# Patient Record
Sex: MALE | Employment: FULL TIME | URBAN - METROPOLITAN AREA
[De-identification: names, ages, dates, MRNs, and addresses within clinical notes are randomized per-mention and may not be internally consistent; named-entity substitution may affect disease eponyms.]

---

## 2024-02-19 ENCOUNTER — HOSPITAL ENCOUNTER (EMERGENCY)
Facility: HOSPITAL | Age: 44
Discharge: HOME | End: 2024-02-19
Attending: STUDENT IN AN ORGANIZED HEALTH CARE EDUCATION/TRAINING PROGRAM
Payer: COMMERCIAL

## 2024-02-19 ENCOUNTER — APPOINTMENT (OUTPATIENT)
Dept: RADIOLOGY | Facility: HOSPITAL | Age: 44
End: 2024-02-19
Payer: COMMERCIAL

## 2024-02-19 VITALS
BODY MASS INDEX: 27.28 KG/M2 | HEIGHT: 68 IN | TEMPERATURE: 98.4 F | DIASTOLIC BLOOD PRESSURE: 91 MMHG | HEART RATE: 82 BPM | SYSTOLIC BLOOD PRESSURE: 161 MMHG | RESPIRATION RATE: 16 BRPM | OXYGEN SATURATION: 100 % | WEIGHT: 180 LBS

## 2024-02-19 DIAGNOSIS — R91.1 PULMONARY NODULE: ICD-10-CM

## 2024-02-19 DIAGNOSIS — D72.829 LEUKOCYTOSIS, UNSPECIFIED TYPE: ICD-10-CM

## 2024-02-19 DIAGNOSIS — R74.8 ELEVATED LIPASE: ICD-10-CM

## 2024-02-19 DIAGNOSIS — K52.9 COLITIS: ICD-10-CM

## 2024-02-19 DIAGNOSIS — K59.00 CONSTIPATION, UNSPECIFIED CONSTIPATION TYPE: Primary | ICD-10-CM

## 2024-02-19 DIAGNOSIS — K76.0 HEPATIC STEATOSIS: ICD-10-CM

## 2024-02-19 DIAGNOSIS — K56.41 FECAL IMPACTION (MULTI): ICD-10-CM

## 2024-02-19 DIAGNOSIS — K40.91 UNILATERAL RECURRENT INGUINAL HERNIA WITHOUT OBSTRUCTION OR GANGRENE: ICD-10-CM

## 2024-02-19 LAB
ALBUMIN SERPL-MCNC: 4.9 G/DL (ref 3.5–5)
ALP BLD-CCNC: 90 U/L (ref 35–125)
ALT SERPL-CCNC: 29 U/L (ref 5–40)
ANION GAP SERPL CALC-SCNC: 11 MMOL/L
APPEARANCE UR: CLEAR
AST SERPL-CCNC: 17 U/L (ref 5–40)
BASOPHILS # BLD AUTO: 0.07 X10*3/UL (ref 0–0.1)
BASOPHILS NFR BLD AUTO: 0.5 %
BILIRUB SERPL-MCNC: <0.2 MG/DL (ref 0.1–1.2)
BILIRUB UR STRIP.AUTO-MCNC: NEGATIVE MG/DL
BUN SERPL-MCNC: 18 MG/DL (ref 8–25)
CALCIUM SERPL-MCNC: 9.3 MG/DL (ref 8.5–10.4)
CHLORIDE SERPL-SCNC: 102 MMOL/L (ref 97–107)
CO2 SERPL-SCNC: 29 MMOL/L (ref 24–31)
COLOR UR: NORMAL
CREAT SERPL-MCNC: 0.8 MG/DL (ref 0.4–1.6)
EGFRCR SERPLBLD CKD-EPI 2021: >90 ML/MIN/1.73M*2
EOSINOPHIL # BLD AUTO: 0.14 X10*3/UL (ref 0–0.7)
EOSINOPHIL NFR BLD AUTO: 1 %
ERYTHROCYTE [DISTWIDTH] IN BLOOD BY AUTOMATED COUNT: 12.4 % (ref 11.5–14.5)
GLUCOSE SERPL-MCNC: 123 MG/DL (ref 65–99)
GLUCOSE UR STRIP.AUTO-MCNC: NORMAL MG/DL
HCT VFR BLD AUTO: 43.6 % (ref 41–52)
HGB BLD-MCNC: 14.3 G/DL (ref 13.5–17.5)
IMM GRANULOCYTES # BLD AUTO: 0.05 X10*3/UL (ref 0–0.7)
IMM GRANULOCYTES NFR BLD AUTO: 0.4 % (ref 0–0.9)
KETONES UR STRIP.AUTO-MCNC: NEGATIVE MG/DL
LEUKOCYTE ESTERASE UR QL STRIP.AUTO: NEGATIVE
LIPASE SERPL-CCNC: 240 U/L (ref 16–63)
LYMPHOCYTES # BLD AUTO: 2.6 X10*3/UL (ref 1.2–4.8)
LYMPHOCYTES NFR BLD AUTO: 19 %
MCH RBC QN AUTO: 28.9 PG (ref 26–34)
MCHC RBC AUTO-ENTMCNC: 32.8 G/DL (ref 32–36)
MCV RBC AUTO: 88 FL (ref 80–100)
MONOCYTES # BLD AUTO: 0.7 X10*3/UL (ref 0.1–1)
MONOCYTES NFR BLD AUTO: 5.1 %
MUCOUS THREADS #/AREA URNS AUTO: NORMAL /LPF
NEUTROPHILS # BLD AUTO: 10.12 X10*3/UL (ref 1.2–7.7)
NEUTROPHILS NFR BLD AUTO: 74 %
NITRITE UR QL STRIP.AUTO: NEGATIVE
NRBC BLD-RTO: 0 /100 WBCS (ref 0–0)
PH UR STRIP.AUTO: 6.5 [PH]
PLATELET # BLD AUTO: 293 X10*3/UL (ref 150–450)
POTASSIUM SERPL-SCNC: 4.3 MMOL/L (ref 3.4–5.1)
PROT SERPL-MCNC: 7.4 G/DL (ref 5.9–7.9)
PROT UR STRIP.AUTO-MCNC: NORMAL MG/DL
RBC # BLD AUTO: 4.94 X10*6/UL (ref 4.5–5.9)
RBC # UR STRIP.AUTO: NEGATIVE /UL
RBC #/AREA URNS AUTO: NORMAL /HPF
SODIUM SERPL-SCNC: 142 MMOL/L (ref 133–145)
SP GR UR STRIP.AUTO: 1.03
UROBILINOGEN UR STRIP.AUTO-MCNC: NORMAL MG/DL
WBC # BLD AUTO: 13.7 X10*3/UL (ref 4.4–11.3)
WBC #/AREA URNS AUTO: NORMAL /HPF

## 2024-02-19 PROCEDURE — 96360 HYDRATION IV INFUSION INIT: CPT | Mod: 59

## 2024-02-19 PROCEDURE — 2500000001 HC RX 250 WO HCPCS SELF ADMINISTERED DRUGS (ALT 637 FOR MEDICARE OP): Performed by: CLINICAL NURSE SPECIALIST

## 2024-02-19 PROCEDURE — 74177 CT ABD & PELVIS W/CONTRAST: CPT

## 2024-02-19 PROCEDURE — 2550000001 HC RX 255 CONTRASTS: Performed by: CLINICAL NURSE SPECIALIST

## 2024-02-19 PROCEDURE — 99285 EMERGENCY DEPT VISIT HI MDM: CPT | Mod: 25

## 2024-02-19 PROCEDURE — 80053 COMPREHEN METABOLIC PANEL: CPT | Performed by: EMERGENCY MEDICINE

## 2024-02-19 PROCEDURE — 2500000004 HC RX 250 GENERAL PHARMACY W/ HCPCS (ALT 636 FOR OP/ED): Performed by: EMERGENCY MEDICINE

## 2024-02-19 PROCEDURE — 76705 ECHO EXAM OF ABDOMEN: CPT

## 2024-02-19 PROCEDURE — 36415 COLL VENOUS BLD VENIPUNCTURE: CPT | Performed by: EMERGENCY MEDICINE

## 2024-02-19 PROCEDURE — 2550000001 HC RX 255 CONTRASTS: Performed by: STUDENT IN AN ORGANIZED HEALTH CARE EDUCATION/TRAINING PROGRAM

## 2024-02-19 PROCEDURE — 83690 ASSAY OF LIPASE: CPT | Performed by: EMERGENCY MEDICINE

## 2024-02-19 PROCEDURE — 81001 URINALYSIS AUTO W/SCOPE: CPT | Performed by: EMERGENCY MEDICINE

## 2024-02-19 PROCEDURE — 85025 COMPLETE CBC W/AUTO DIFF WBC: CPT | Performed by: EMERGENCY MEDICINE

## 2024-02-19 RX ORDER — POLYETHYLENE GLYCOL 3350, SODIUM CHLORIDE, SODIUM BICARBONATE, POTASSIUM CHLORIDE 420; 11.2; 5.72; 1.48 G/4L; G/4L; G/4L; G/4L
2000 POWDER, FOR SOLUTION ORAL ONCE
Status: COMPLETED | OUTPATIENT
Start: 2024-02-19 | End: 2024-02-19

## 2024-02-19 RX ORDER — AMOXICILLIN AND CLAVULANATE POTASSIUM 875; 125 MG/1; MG/1
1 TABLET, FILM COATED ORAL 2 TIMES DAILY
Qty: 20 TABLET | Refills: 0 | Status: SHIPPED | OUTPATIENT
Start: 2024-02-19 | End: 2024-02-29

## 2024-02-19 RX ORDER — POLYETHYLENE GLYCOL 3350 17 G/17G
17 POWDER, FOR SOLUTION ORAL DAILY
Qty: 7 PACKET | Refills: 0 | Status: SHIPPED | OUTPATIENT
Start: 2024-02-19 | End: 2024-02-26

## 2024-02-19 RX ORDER — POLYETHYLENE GLYCOL 3350 17 G/17G
17 POWDER, FOR SOLUTION ORAL DAILY
Qty: 7 PACKET | Refills: 0 | Status: SHIPPED | OUTPATIENT
Start: 2024-02-19 | End: 2024-02-19

## 2024-02-19 RX ADMIN — POLYETHYLENE GLYCOL 3350, SODIUM SULFATE ANHYDROUS, SODIUM BICARBONATE, SODIUM CHLORIDE, POTASSIUM CHLORIDE 2000 ML: 236; 22.74; 6.74; 5.86; 2.97 POWDER, FOR SOLUTION ORAL at 21:22

## 2024-02-19 RX ADMIN — SODIUM CHLORIDE 1000 ML: 900 INJECTION, SOLUTION INTRAVENOUS at 20:07

## 2024-02-19 RX ADMIN — IOHEXOL 75 ML: 350 INJECTION, SOLUTION INTRAVENOUS at 20:59

## 2024-02-19 ASSESSMENT — PAIN - FUNCTIONAL ASSESSMENT: PAIN_FUNCTIONAL_ASSESSMENT: 0-10

## 2024-02-19 ASSESSMENT — PAIN DESCRIPTION - LOCATION: LOCATION: ABDOMEN

## 2024-02-19 ASSESSMENT — COLUMBIA-SUICIDE SEVERITY RATING SCALE - C-SSRS
6. HAVE YOU EVER DONE ANYTHING, STARTED TO DO ANYTHING, OR PREPARED TO DO ANYTHING TO END YOUR LIFE?: NO
2. HAVE YOU ACTUALLY HAD ANY THOUGHTS OF KILLING YOURSELF?: NO
1. IN THE PAST MONTH, HAVE YOU WISHED YOU WERE DEAD OR WISHED YOU COULD GO TO SLEEP AND NOT WAKE UP?: NO

## 2024-02-19 ASSESSMENT — PAIN DESCRIPTION - PAIN TYPE: TYPE: ACUTE PAIN

## 2024-02-19 ASSESSMENT — PAIN DESCRIPTION - DESCRIPTORS: DESCRIPTORS: DISCOMFORT;PRESSURE

## 2024-02-19 ASSESSMENT — PAIN SCALES - GENERAL: PAINLEVEL_OUTOF10: 9

## 2024-02-19 NOTE — ED TRIAGE NOTES
"   TRIAGE NOTE   I saw the patient as the Clinician in Triage and performed a brief history and physical exam, established acuity, and ordered appropriate tests to develop basic plan of care. Patient will be seen by an JEAN, resident and/or physician who will independently evaluate the patient. Please see subsequent provider notes for further details and disposition.     Brief HPI: In brief, Luc Wong is a 46 y.o. male that presents for evaluation of constipation.  The patient states that he had a normal bowel movement 2 mornings ago, on Saturday morning.  He did not have a bowel movement yesterday and throughout the day today he feels like he has have a bowel movement but cannot pass any stool.  He states that today he stuck his finger into his rectum and felt hard stool but he was unable to remove any stool.  He states that his finger just pushed the stool \"up\".  Patient denies any blood per rectum.  He does describe some vague suprapubic pressure discomfort.  He is complaining of rectal pain especially when he sits down.  Focused Physical exam:   Triage vitals remarkable for elevated blood pressure.  Heart rate is in the 80s with regular rhythm and no murmurs.  Lungs are clear to auscultation bilaterally.  The abdomen is soft and nondistended and vaguely tender with palpation in the suprapubic region.  No guarding or rigidity.  Plan/MDM:   Plan is for IV fluids, labs and CT abdomen and pelvis.  Please see subsequent provider note for further details and disposition     Mati Collado D.O.  6:44 PM  "

## 2024-02-20 LAB — HOLD SPECIMEN: NORMAL

## 2024-02-20 NOTE — ED PROVIDER NOTES
Department of Emergency Medicine   ED  Provider Note  Admit Date/RoomTime: 2/19/2024  6:14 PM  ED Room: ST25/ST25        History of Present Illness:  Chief Complaint   Patient presents with    Constipation         Luc Wong is a 46 y.o. male presenting to the ED for constipation per triage note patient had a normal bowel movement 2 mornings ago and has not had a bowel movement yesterday or today.  Per the triage note patient felt hard stool in his rectum.  Patient initially seen evaluated in triage orders placed   Upon my evaluation:  Patient complains of rectal pain.  Reports his last bowel movement was a day and a half ago.  He is able to feel hard stool in his rectum but unable to get it out.  He complains of abdominal fullness.  Denies any abdominal pain no nausea no vomiting no fever no chills no cough congestion runny nose sore throat.  Eating and drinking per his normal urinating per his normal.  Reports he just feels like he is full stool.  Has significant history of inguinal hernia with no complications.  Normally has no complications with his bowel movements.      review of Systems:   Pertinent positives and negatives are stated within HPI, all other systems reviewed and are negative.        --------------------------------------------- PAST HISTORY ---------------------------------------------  Past Medical History:  has no past medical history on file.  Past Surgical History:  has no past surgical history on file.  Social History:    Family History: family history is not on file.. Unless otherwise noted, family history is non contributory  The patient’s home medications have been reviewed.  Allergies: Patient has no known allergies.        ---------------------------------------------------PHYSICAL EXAM--------------------------------------    GENERAL APPEARANCE: Awake and alert.   VITAL SIGNS: As per the nurses' triage record.   HEENT: Normocephalic, atraumatic. Extraocular muscles are intact.   "Conjunctiva are pink. Negative scleral icterus. Mucous membranes are moist. Tongue in the midline. Pharynx was without erythema or exudates, uvula midline  CHEST: Nontender to palpation. Clear to auscultation bilaterally. No rales, rhonchi, or wheezing.   HEART: S1, S2. Regular rate and rhythm. No murmurs, gallops or rubs.  Strong and equal pulses in the extremities.   ABDOMEN: Soft, nontender, nondistended, positive bowel sounds, no palpable masses.  Rectal : No fissures lesions or tears noted.  Patient does have a soft brown stool in the rectal vault.  Scant amount was removed with fecal disimpaction.  Enema was placed with no problem.  Tolerated well.  No bleeding no black tarry stools   : Hernia left side reducible checked by attending physician in my presence.  No testicular pain  MUSCULCSKELETAL:. Full gross active range of motion. Ambulating on own with no acute difficulties  NEUROLOGICAL: Awake, alert and oriented x 3. Power intact in the upper and lower extremities. Sensation is intact to light touch in the upper and lower extremities.   IMMUNOLOGICAL: No lymphatic streaking noted   DERM: No petechiae, rashes, or ecchymoses.          ------------------------- NURSING NOTES AND VITALS REVIEWED ---------------------------  The nursing notes within the ED encounter and vital signs as below have been reviewed by myself  BP (!) 161/91   Pulse 82   Temp 36.9 °C (98.4 °F)   Resp 16   Ht 1.727 m (5' 8\")   Wt 81.6 kg (180 lb)   SpO2 100%   BMI 27.37 kg/m²     Oxygen Saturation Interpretation: 100% room air    The patient’s available past medical records and past encounters were reviewed.          -----------------------DIAGNOSTIC RESULTS------------------------  LABS:    Labs Reviewed   CBC WITH AUTO DIFFERENTIAL - Abnormal       Result Value    WBC 13.7 (*)     nRBC 0.0      RBC 4.94      Hemoglobin 14.3      Hematocrit 43.6      MCV 88      MCH 28.9      MCHC 32.8      RDW 12.4      Platelets 293      " Neutrophils % 74.0      Immature Granulocytes %, Automated 0.4      Lymphocytes % 19.0      Monocytes % 5.1      Eosinophils % 1.0      Basophils % 0.5      Neutrophils Absolute 10.12 (*)     Immature Granulocytes Absolute, Automated 0.05      Lymphocytes Absolute 2.60      Monocytes Absolute 0.70      Eosinophils Absolute 0.14      Basophils Absolute 0.07     COMPREHENSIVE METABOLIC PANEL - Abnormal    Glucose 123 (*)     Sodium 142      Potassium 4.3      Chloride 102      Bicarbonate 29      Urea Nitrogen 18      Creatinine 0.80      eGFR >90      Calcium 9.3      Albumin 4.9      Alkaline Phosphatase 90      Total Protein 7.4      AST 17      Bilirubin, Total <0.2      ALT 29      Anion Gap 11     LIPASE - Abnormal    Lipase 240 (*)    URINALYSIS WITH REFLEX CULTURE AND MICROSCOPIC - Normal    Color, Urine Light-Yellow      Appearance, Urine Clear      Specific Gravity, Urine 1.031      pH, Urine 6.5      Protein, Urine 10 (TRACE)      Glucose, Urine Normal      Blood, Urine NEGATIVE      Ketones, Urine NEGATIVE      Bilirubin, Urine NEGATIVE      Urobilinogen, Urine Normal      Nitrite, Urine NEGATIVE      Leukocyte Esterase, Urine NEGATIVE     URINALYSIS WITH REFLEX CULTURE AND MICROSCOPIC    Narrative:     The following orders were created for panel order Urinalysis with Reflex Culture and Microscopic.  Procedure                               Abnormality         Status                     ---------                               -----------         ------                     Urinalysis with Reflex C...[[id number]]  Normal              Final result               Extra Urine Gray Tube[[id number]]                            In process                   Please view results for these tests on the individual orders.   EXTRA URINE GRAY TUBE   URINALYSIS MICROSCOPIC WITH REFLEX CULTURE    WBC, Urine 1-5      RBC, Urine 1-2      Mucus, Urine FEW         As interpreted by me, the above displayed labs are abnormal. All  other labs obtained during this visit were within normal range or not returned as of this dictation.    US gallbladder   Final Result   Diffuse hepatic steatosis, otherwise unremarkable ultrasound of the   right upper quadrant.  No cholelithiasis, gallbladder wall thickening,   or biliary dilatation is appreciated.   Signed by Benedict Hawk II, MD      CT abdomen pelvis w IV contrast   Final Result   1. Likely colitis involving the ascending colon, sigmoid colon and   rectum. There is extension of the sigmoid colon into a LEFT inguinal   hernia. Stranding within the hernia is present. Uncertain whether   appearance of the colon is related to incarceration with partial   strangulation of the sigmoid colon versus underlying colitis.  However   colitis is considered more likely.   2. Hepatic steatosis is present. No suspicious hepatic lesion is   demonstrated. No biliary dilatation is demonstrated. The gallbladder   appears within normal limits.   3. RIGHT lower lobe pulmonary nodules. Follow-up chest CT recommended   if patient is considered high risk for lung carcinoma.   Signed by Benedict Hawk II, MD              US gallbladder   Final Result   Diffuse hepatic steatosis, otherwise unremarkable ultrasound of the   right upper quadrant.  No cholelithiasis, gallbladder wall thickening,   or biliary dilatation is appreciated.   Signed by Benedict Hawk II, MD      CT abdomen pelvis w IV contrast   Final Result   1. Likely colitis involving the ascending colon, sigmoid colon and   rectum. There is extension of the sigmoid colon into a LEFT inguinal   hernia. Stranding within the hernia is present. Uncertain whether   appearance of the colon is related to incarceration with partial   strangulation of the sigmoid colon versus underlying colitis.  However   colitis is considered more likely.   2. Hepatic steatosis is present. No suspicious hepatic lesion is   demonstrated. No biliary dilatation is demonstrated. The  gallbladder   appears within normal limits.   3. RIGHT lower lobe pulmonary nodules. Follow-up chest CT recommended   if patient is considered high risk for lung carcinoma.   Signed by Benedict Hawk II, MD              ------------------------------ ED COURSE/MEDICAL DECISION MAKING----------------------  Medical Decision Making:   Exam: A medically appropriate exam performed, outlined above, given the known history and presentation.    History obtained from: Review of medical record nursing notes patient      Social Determinants of Health considered during this visit: Lives at home takes care of himself at home.      PAST MEDICAL HISTORY/Chronic Conditions Affecting Care     has no past medical history on file.       CC/HPI Summary, Social Determinants of health, Records Reviewed, DDx, testing done/not done, ED Course, Reassessment, disposition considerations/shared decision making with patient, consults, disposition:   Presents with constipation and stool in the rectum  Plan  CBC  CMP  Lipase  Urine  Ultrasound gallbladder : Diffuse hepatic steatosis, otherwise unremarkable ultrasound of the  right upper quadrant.  No cholelithiasis, gallbladder wall thickening,  or biliary dilatation is appreciated.  CT abdomen pelvis- 1. Likely colitis involving the ascending colon, sigmoid colon and  rectum. There is extension of the sigmoid colon into a LEFT inguinal  hernia. Stranding within the hernia is present. Uncertain whether  appearance of the colon is related to incarceration with partial  strangulation of the sigmoid colon versus underlying colitis.  However  colitis is considered more likely.  2. Hepatic steatosis is present. No suspicious hepatic lesion is  demonstrated. No biliary dilatation is demonstrated. The gallbladder  appears within normal limits.  3. RIGHT lower lobe pulmonary nodules. Follow-up chest CT recommended  if patient is considered high risk for lung carcinoma.  Normal saline  Medical Decision  Making/Differential Diagnosis:  Differentials include but not limited to constipation versus fecal impaction versus obstruction   Review:  Lipase 240  Glucose 123  Electrolytes within normal limits  BUN 18  Creatinine 0.8  LFTs within normal limits  Urine negative for leukocytes nitrites not consistent with UTI no blood  White blood cell count elevated at 13.7  Hemoglobin 14.3  White blood cell count 13.7  Patient presented to the emergency department complaints of inability to move his bowels.  With large stool in the rectum.  Rectal pain.  No other pain or discomfort.  Urine was not consistent with UTI Leukos 123 electrolytes within normal limits LFTs within normal limits normal renal function.  Lipase was elevated no abdominal pain.  Ultrasound was obtained showed diffuse hepatic steatosis otherwise unremarkable.  Patient does have an elevated white blood cell count CT of the abdomen pelvis showed likely colitis involving the ascending colon sigmoid colon and rectum.  Patient also noted to have a left inguinal hernia that is reducible no pain.  Reports hernia has been present for several years.  He does not want surgery he is able to reduce the hernia has had no pain around the hernia area.  Does appear to be an inflamed secondary to the colitis.  Patient placed on antibiotic therapy Augmentin but declined antibiotics at this time.  Reports he is not having any fever chills nausea vomiting.  He had rectal pain that was resolved with the enema and bowel movement.  Patient is nontoxic.  He is not anemic  Patient seen and evaluated with attending physician   Incidental findings of CTs provided with him.  Referral provided for pulmonology for pulmonary nodule general surgery for inguinal hernia, gastroenterology for elevated lipase hepatic steatosis and colitis  Provided MiraLAX for constipation  Blood pressure also noted be elevated no history of hypertension vies him follow-up with primary care physician  for reevaluation monitor blood pressure daily.  Patient is in no acute distress reports his symptoms are back to normal he has no pain at this time.  He is nontoxic.  Well-nourished well-hydrated and is agreeable to discharge  PROCEDURES  Unless otherwise noted below, none  General    Performed by: CAROLIN Conner  Authorized by: Thang Knight DO    Consent:     Consent obtained:  Verbal    Consent given by:  Patient    Risks, benefits, and alternatives were discussed: yes      Risks discussed:  Bleeding, infection and pain    Alternatives discussed:  No treatment  Universal protocol:     Procedure explained and questions answered to patient or proxy's satisfaction: yes      Relevant documents present and verified: yes      Patient identity confirmed:  Verbally with patient and arm band  Indications:     Indications:  Fecal impaction  Sedation:     Sedation type:  None  Anesthesia:     Anesthesia method:  None  Post-procedure details:     Procedure completion:  Tolerated well, no immediate complications     Patient placed in position of comfort on his right side.  Chaperone present.  Patient has a large amount of stool in the rectal vault.  Scant amount was able to be removed due to the fecal ball is high up in the rectum.  An enema was then placed patient tolerated well.  Will reevaluate if unable to have bowel movement will reassess for additional fecal impaction.  CONSULTS:   None      ED Course as of 02/19/24 2258   Mon Feb 19, 2024 2121 Fecal disimpaction performed.  The patient also received enema. [TB]      ED Course User Index  [TB] CAROLIN Conner         Diagnoses as of 02/19/24 2258   Constipation, unspecified constipation type   Fecal impaction (CMS/HCC)   Leukocytosis, unspecified type   Elevated lipase   Colitis   Unilateral recurrent inguinal hernia without obstruction or gangrene   Hepatic steatosis   Pulmonary nodule         This patient has remained hemodynamically stable  during their ED course.      Critical Care: none       Counseling:  The emergency provider has spoken with the patient and discussed today’s results, in addition to providing specific details for the plan of care and counseling regarding the diagnosis and prognosis.  Questions are answered at this time and they are agreeable with the plan.         --------------------------------- IMPRESSION AND DISPOSITION ---------------------------------    IMPRESSION  1. Constipation, unspecified constipation type    2. Fecal impaction (CMS/HCC)    3. Leukocytosis, unspecified type    4. Elevated lipase    5. Colitis    6. Unilateral recurrent inguinal hernia without obstruction or gangrene    7. Hepatic steatosis    8. Pulmonary nodule        DISPOSITION  Disposition: Discharge home  Patient condition is stable improved        NOTE: This report was transcribed using voice recognition software. Every effort was made to ensure accuracy; however, inadvertent computerized transcription errors may be present      CAROLIN Conner  02/19/24 2258       CAROLIN Conner  02/19/24 2258       NAJMA Conner-TINY  02/20/24 [id number]

## 2024-02-20 NOTE — DISCHARGE INSTRUCTIONS
Increase fluids  Increase fiber  MiraLAX as directed hold for diarrhea  Today in the emergency department received a enema fecal disimpaction.  And GoLytely bowel prep to help move bowels.  Please be aware that you have likely diarrhea for the next day or 2.  Make sure you are drinking plenty of fluids  Follow-up with primary care physician in 2 days for reevaluation  Return to the emergency department any worsening symptoms or concerns  Today on your CAT scan noted to have incidental finding of pulmonary nodule and is currently follow-up with your primary care physician pulmonologist for reevaluation.   Follow-up with gastroenterology for the hepatic steatosis and elevated lipase.  As well as the colitis.  We discussed antibiotic use for you due to the elevated white blood cell count and concerning for infection.  Take antibiotic until completed  In the emergency department GoLytely was ordered to help with constipation you have declined.  Was given a prescription for MiraLAX had positive results from enema.  Return to the emergency department any worsening symptoms or concerns follow-up with primary care physician in 2 days for reevaluation  Blood pressure was elevated during her blood pressure daily for primary care physician return to emergency department any worsening symptoms or concerns